# Patient Record
Sex: MALE | Race: WHITE
[De-identification: names, ages, dates, MRNs, and addresses within clinical notes are randomized per-mention and may not be internally consistent; named-entity substitution may affect disease eponyms.]

---

## 2019-08-26 ENCOUNTER — HOSPITAL ENCOUNTER (INPATIENT)
Dept: HOSPITAL 83 - ED | Age: 69
LOS: 5 days | Discharge: OTHER INSTITUTION | DRG: 280 | End: 2019-08-31
Attending: INTERNAL MEDICINE | Admitting: INTERNAL MEDICINE
Payer: MEDICARE

## 2019-08-26 VITALS — DIASTOLIC BLOOD PRESSURE: 42 MMHG

## 2019-08-26 VITALS — SYSTOLIC BLOOD PRESSURE: 114 MMHG | DIASTOLIC BLOOD PRESSURE: 46 MMHG

## 2019-08-26 VITALS — SYSTOLIC BLOOD PRESSURE: 115 MMHG | DIASTOLIC BLOOD PRESSURE: 35 MMHG

## 2019-08-26 VITALS — DIASTOLIC BLOOD PRESSURE: 64 MMHG | SYSTOLIC BLOOD PRESSURE: 145 MMHG

## 2019-08-26 VITALS — SYSTOLIC BLOOD PRESSURE: 120 MMHG | DIASTOLIC BLOOD PRESSURE: 56 MMHG

## 2019-08-26 VITALS — HEIGHT: 68 IN | WEIGHT: 244 LBS | BODY MASS INDEX: 36.98 KG/M2

## 2019-08-26 VITALS — DIASTOLIC BLOOD PRESSURE: 48 MMHG | SYSTOLIC BLOOD PRESSURE: 116 MMHG

## 2019-08-26 DIAGNOSIS — N18.3: ICD-10-CM

## 2019-08-26 DIAGNOSIS — D72.829: ICD-10-CM

## 2019-08-26 DIAGNOSIS — Z86.73: ICD-10-CM

## 2019-08-26 DIAGNOSIS — E83.41: ICD-10-CM

## 2019-08-26 DIAGNOSIS — F41.9: ICD-10-CM

## 2019-08-26 DIAGNOSIS — J96.11: ICD-10-CM

## 2019-08-26 DIAGNOSIS — Z82.49: ICD-10-CM

## 2019-08-26 DIAGNOSIS — Z79.84: ICD-10-CM

## 2019-08-26 DIAGNOSIS — E11.51: ICD-10-CM

## 2019-08-26 DIAGNOSIS — L97.519: ICD-10-CM

## 2019-08-26 DIAGNOSIS — I25.709: ICD-10-CM

## 2019-08-26 DIAGNOSIS — E53.8: ICD-10-CM

## 2019-08-26 DIAGNOSIS — Z71.6: ICD-10-CM

## 2019-08-26 DIAGNOSIS — E87.8: ICD-10-CM

## 2019-08-26 DIAGNOSIS — Z79.899: ICD-10-CM

## 2019-08-26 DIAGNOSIS — I13.0: ICD-10-CM

## 2019-08-26 DIAGNOSIS — I50.43: ICD-10-CM

## 2019-08-26 DIAGNOSIS — I25.5: ICD-10-CM

## 2019-08-26 DIAGNOSIS — E11.621: ICD-10-CM

## 2019-08-26 DIAGNOSIS — D53.9: ICD-10-CM

## 2019-08-26 DIAGNOSIS — F17.210: ICD-10-CM

## 2019-08-26 DIAGNOSIS — Z79.4: ICD-10-CM

## 2019-08-26 DIAGNOSIS — Z81.8: ICD-10-CM

## 2019-08-26 DIAGNOSIS — Z88.8: ICD-10-CM

## 2019-08-26 DIAGNOSIS — G47.30: ICD-10-CM

## 2019-08-26 DIAGNOSIS — D69.6: ICD-10-CM

## 2019-08-26 DIAGNOSIS — E44.0: ICD-10-CM

## 2019-08-26 DIAGNOSIS — Z79.82: ICD-10-CM

## 2019-08-26 DIAGNOSIS — K59.00: ICD-10-CM

## 2019-08-26 DIAGNOSIS — E11.42: ICD-10-CM

## 2019-08-26 DIAGNOSIS — E11.65: ICD-10-CM

## 2019-08-26 DIAGNOSIS — Z95.5: ICD-10-CM

## 2019-08-26 DIAGNOSIS — Z95.1: ICD-10-CM

## 2019-08-26 DIAGNOSIS — K21.9: ICD-10-CM

## 2019-08-26 DIAGNOSIS — E11.22: ICD-10-CM

## 2019-08-26 DIAGNOSIS — I21.4: Primary | ICD-10-CM

## 2019-08-26 DIAGNOSIS — E66.9: ICD-10-CM

## 2019-08-26 DIAGNOSIS — I25.2: ICD-10-CM

## 2019-08-26 DIAGNOSIS — R00.1: ICD-10-CM

## 2019-08-26 LAB
ALBUMIN SERPL-MCNC: 3.2 GM/DL (ref 3.1–4.5)
ALP SERPL-CCNC: 101 U/L (ref 45–117)
ALT SERPL W P-5'-P-CCNC: 15 U/L (ref 12–78)
APPEARANCE UR: CLEAR
APTT PPP: 36.6 SECONDS (ref 20–32.1)
AST SERPL-CCNC: 16 IU/L (ref 3–35)
BACTERIA #/AREA URNS HPF: (no result) /[HPF]
BASOPHILS # BLD AUTO: 0 10*3/UL (ref 0–0.1)
BASOPHILS NFR BLD AUTO: 0.2 % (ref 0–1)
BILIRUB UR QL STRIP: NEGATIVE
BUN SERPL-MCNC: 36 MG/DL (ref 7–24)
CHLORIDE SERPL-SCNC: 113 MMOL/L (ref 98–107)
COLOR UR: YELLOW
CREAT SERPL-MCNC: 1.63 MG/DL (ref 0.7–1.3)
EOSINOPHIL # BLD AUTO: 0.2 10*3/UL (ref 0–0.4)
EOSINOPHIL # BLD AUTO: 1.2 % (ref 1–4)
ERYTHROCYTE [DISTWIDTH] IN BLOOD BY AUTOMATED COUNT: 17.1 % (ref 0–14.5)
GLUCOSE UR QL: NEGATIVE
HCT VFR BLD AUTO: 28.2 % (ref 42–52)
HGB BLD-MCNC: 8.5 G/DL (ref 14–18)
HGB UR QL STRIP: NEGATIVE
INR BLD: 1 (ref 2–3.5)
KETONES UR QL STRIP: NEGATIVE
LEUKOCYTE ESTERASE UR QL STRIP: (no result)
LIPASE SERPL-CCNC: 72 U/L (ref 73–393)
LYMPHOCYTES # BLD AUTO: 1 10*3/UL (ref 1.3–4.4)
LYMPHOCYTES NFR BLD AUTO: 8.5 % (ref 27–41)
MCH RBC QN AUTO: 31.4 PG (ref 27–31)
MCHC RBC AUTO-ENTMCNC: 30.1 G/DL (ref 33–37)
MCV RBC AUTO: 104.1 FL (ref 80–94)
MONOCYTES # BLD AUTO: 0.9 10*3/UL (ref 0.1–1)
MONOCYTES NFR BLD MANUAL: 7 % (ref 3–9)
NEUT #: 10.1 10*3/UL (ref 2.3–7.9)
NEUT %: 82 % (ref 47–73)
NITRITE UR QL STRIP: NEGATIVE
NRBC BLD QL AUTO: 0 % (ref 0–0)
PH UR STRIP: 5.5 [PH] (ref 5–9)
PLATELET # BLD AUTO: 124 10*3/UL (ref 130–400)
PMV BLD AUTO: 11.7 FL (ref 9.6–12.3)
POTASSIUM SERPL-SCNC: 4.9 MMOL/L (ref 3.5–5.1)
PROT SERPL-MCNC: 6.4 GM/DL (ref 6.4–8.2)
RBC # BLD AUTO: 2.71 10*6/UL (ref 4.5–5.9)
SODIUM SERPL-SCNC: 141 MMOL/L (ref 136–145)
SP GR UR: 1.02 (ref 1–1.03)
TROPONIN I SERPL-MCNC: 1.11 NG/ML (ref ?–0.04)
UROBILINOGEN UR STRIP-MCNC: 0.2 E.U./DL (ref 0.2–1)
WBC #/AREA URNS HPF: (no result) WBC/HPF (ref 0–5)
WBC NRBC COR # BLD AUTO: 12.3 10*3/UL (ref 4.8–10.8)

## 2019-08-26 NOTE — EKG
[place], [place]
 
                               ELECTROCARDIOGRAM REPORT
 
        NAME: ELISABETH BENAVIDES SR                ACCT #: [id number]  
        UNIT #: [id number]                        ROOM: [facility]    
        DOCTOR: LI DRAFT REPORT          BIRTHDATE: [birth year]
 
 
 

 
 
                           [facility]
                                       
Test Date:    2019               Test Time:    17:50:33
Pat Name:     ELISABETH BENAVIDES           Department:   
Patient ID:   [id number]             Room:         [facility]
Gender:       M                        Technician:   Martine Scales
:          1950               Requested By: ELISABETH SOTO
Order Number: [id number]      Reading MD:   Juan Forman MD
                                 Measurements
Intervals                              Axis          
Rate:         61                       P:            
HI:                                    QRS:          120
QRSD:         199                      T:            44
QT:           533                                    
QTc:          537                                    
                           Interpretive Statements
Sinus rhythm
RBBB and LPFB
 
 
Electronically Signed On 2019 4:16:17 PDT by Juan Forman MD
 
CM:EKGRPT:ELECTROCARDIOGRAM REPORT
 
D: 19 1750
T: 19 0416
    
ELISABETH SÁNCHEZ DRAFT REPORT         
ELISABETH SOTO DO

## 2019-08-26 NOTE — EKG
[place], [place]
 
                               ELECTROCARDIOGRAM REPORT
 
        NAME: ELISABETH BENAVIDES SR                ACCT #: [id number]  
        UNIT #: [id number]                        ROOM: [facility]    
        DOCTOR: LI DRAFT REPORT          BIRTHDATE: [birth year]
 
 
 

 
 
                           [facility]
                                       
Test Date:    2019               Test Time:    19:31:51
Pat Name:     ELISABETH BENAVIDES           Department:   
Patient ID:   [id number]             Room:         [facility]
Gender:       M                        Technician:   Andrés Aponte
:          1950               Requested By: ELISABETH SOTO
Order Number: [id number]      Reading MD:   Juan Forman MD
                                 Measurements
Intervals                              Axis          
Rate:         61                       P:            60
WI:           162                      QRS:          116
QRSD:         184                      T:            64
QT:           506                                    
QTc:          510                                    
                           Interpretive Statements
Sinus rhythm
RBBB and LPFB
 
Electronically Signed On 2019 4:21:10 PDT by Juan Forman MD
 
CM:EKGRPT:ELECTROCARDIOGRAM REPORT
 
D: 19
T: 19 0421
    
ELISABETH SÁNCHEZ DRAFT REPORT         
ELISABETH SOTO DO

## 2019-08-26 NOTE — PR
[place], [place]
 
                                      PROGRESS NOTE
 
        NAME: ELISABETH BENAVIDES SR               [id number]  
        [facility] #: [id number]                       ROOM: [facility]    
        DOCTOR: REY RASMUSSEN                   BIRTHDATE: [birth year]
 
 
DOS: 08/29/2019
 
CARDIOLOGY PROGRESS NOTE
 
The patient is being seen in followup for non-ST elevation MI and acute on
chronic heart failure.
 
SUBJECTIVE:  The patient states the breathing is better.  Denies any chest pain.
 Continues to diurese well, negative another 2.5 liters overnight.  He remained
in sinus rhythm on telemetry.
 
PHYSICAL EXAMINATION:
VITAL SIGNS:  Afebrile, pulse 60, respirations 14, blood pressure 138/52,
saturating 98% on 2 liters nasal cannula.
GENERAL:  He is an obese male now sitting up in a chair, in no distress.
NECK:  Supple.  Jugular venous pressure difficult to assess given body habitus. 
No carotid bruits.
RESPIRATORY:  Still with some bibasilar rales.
CARDIOVASCULAR:  Regular rhythm with a normal rate.  No murmurs.
ABDOMEN:  Obese.  Positive bowel sounds.  Soft and nontender.
EXTREMITIES:  No significant edema.
SKIN:  Multiple skin abrasions are covered.  Feet were covered.
 
LABORATORY DATA:  Hemoglobin 9.6, platelets 125, potassium 3.5, bicarbonate 33,
BUN 30, creatinine 1.32.
 
Current cardiac medications include metoprolol succinate 25 mg p.o. daily,
lisinopril 5 mg daily, furosemide 40 mg IV b.i.d., clopidogrel 75 mg daily,
aspirin 81 mg daily, amiodarone 200 mg daily, atorvastatin 80 mg daily,
enoxaparin 100 mg subcutaneous b.i.d.
 
Echocardiogram, this admission revealed mildly reduced LV systolic function with
EF of 45-50%.
 
IMPRESSION:
1.  Non-ST elevation myocardial infarction, peak troponin 1.6.  Continues to be
stable and chest pain free.
2.  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
diuresing well and continues to improve.
3.  Mild ischemic cardiomyopathy, EF 45-50%.
4.  Anemia with recent gastrointestinal bleed due to gastritis.
5.  Known coronary artery disease, status post 4-vessel bypass.
6.  PCI in 03/2019 to the proximal vein graft to OM, residual disease in the
distal graft that was unable to be intervened upon.
7.  Question of paroxysmal atrial fibrillation, maintained on amiodarone, has
refused anticoagulation in the past.
8.  Chronic kidney disease, creatinine continues to improve with diuresis.
9.  Anemia and thrombocytopenia.
10.  Peripheral vascular disease with history of lower extremity interventions
as well as carotid endarterectomy.
 
                              [place], [place]
 
                                      PROGRESS NOTE
 
        NAME: ELISABETH BENAVIDES SR               ACCT #: [id number]  
        UNIT #: [id number]                       ROOM: [facility]    
        DOCTOR: REY RASMUSSEN                   BIRTHDATE: [birth year]
 
 
11.  Diabetes, active smoker, obesity, untreated sleep apnea,
12.  Diabetic foot ulcers.
 
RECOMMENDATIONS:
1.  Continue with IV diuretics and monitoring of I's and O's.
2.  Metoprolol tartrate was switched to succinate given his LV systolic
dysfunction.
3.  I have resumed his lisinopril and discontinue the isosorbide and
hydralazine.
4.  Risk factor modification with smoking cessation encouraged.
5.  Outpatient sleep study upon discharge.
6.  Continue to treat MI medically, with no immediate plans for cardiac
catheterization given the recent bleed.
 
 
 
_________________________________
Dr. REY RASMUSSEN MD
 
CM:PNTRANS
 
D: 08/29/19 1118                 
T: 08/29/19 1237
             
                                                            
REY RASMUSSEN                   
                                                            
08/29/19
1237
                              
interface

## 2019-08-26 NOTE — PR
[place], [place]
 
                                      PROGRESS NOTE
 
        NAME: ELISABETH BENAVIDES SR               [facility] #: [id number]  
        UNIT #: [id number]                       ROOM: 507       
        DOCTOR: REY RASMUSSEN                   BIRTHDATE: [birth year]
 
 
DOS: 08/31/2019
 
CARDIOLOGY PROGRESS NOTE
 
The patient is being seen in followup for non-ST elevation MI and acute on
chronic heart failure.
 
SUBJECTIVE:  The patient still has not moved his bowels.  He still has low
appetite, some abdominal discomfort with nausea.  He states, however, his
breathing is better.  Denies any chest pain.  Net negative another 1.7 liters in
the past 24 hours.  I had switched him to oral furosemide as of yesterday
evening.  He remained in normal sinus rhythm on the monitor with no events.
 
OBJECTIVE:
VITAL SIGNS:  Afebrile, pulse 68, respirations 20, blood pressure 118/60,
saturating 100% on 3 liters.
GENERAL APPEARANCE:  Overweight older gentleman sitting up in a chair, in no
distress.
NECK:  Supple.  JVP appears normal.  No carotid bruits.
RESPIRATORY:  Lungs are diminished.
CARDIOVASCULAR:  Regular rhythm, with normal rate.  No murmurs.
ABDOMEN:  Obese.  Positive bowel sounds.  Soft, nontender.
EXTREMITIES:  Multiple skin abrasions.  Right foot is wrapped and has a wound
VAC attached.  No significant peripheral edema.
 
LABORATORY DATA:  Hemoglobin 10, with platelets of 124, potassium 3.9,
bicarbonate 39, BUN 31, with creatinine up to 1.53 from 1.29 yesterday.
 
CURRENT CARDIAC MEDICATIONS:  Include furosemide 40 mg p.o. b.i.d., metoprolol
succinate 25 mg p.o. daily, lisinopril 5 mg p.o. daily, clopidogrel 75 mg p.o.
daily, aspirin 81 mg p.o. daily, amiodarone 200 mg p.o. daily, atorvastatin 80
mg p.o. daily.
 
IMPRESSION:
1.  Non-ST elevation myocardial infarction, troponin peaked at 1.6.  No further
chest pain.
2.  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
negative over 10 liters for the hospital stay, nearly euvolemic.
3.  Mild ischemic cardiomyopathy, ejection fraction 45-50%.
4.  Anemia with recent gastrointestinal bleed with gastritis.
5.  Known coronary artery disease, status post 4-vessel bypass back in 2011 and
2012.
6.  Recent PCI in 03/2019 to the proximal portion of vein graft to the OM. 
There is residual severe disease in the distal portion of the graft that was
unable to be intervened upon.
7.  Question of paroxysmal atrial fibrillation, maintained on amiodarone, has
declined anticoagulation in the past.
8.  Chronic kidney disease, creatinine bumped slightly.
9.  Anemia, thrombocytopenia, stable.
10.  Peripheral vascular disease, with lower extremity interventions in the past
 
                              [place], [place]
 
                                      PROGRESS NOTE
 
        NAME: ELISABETH BENAVIDES SR               ACCT #: [id number]  
        UNIT #: [id number]                       ROOM: 507       
        DOCTOR: REY RASMUSSEN                   BIRTHDATE: [birth year]
 
 
as well as carotid endarterectomy.
11.  Diabetic foot ulcers.
12.  Type 2 diabetes, active smoker, obesity, untreated sleep apnea.
 
RECOMMENDATIONS:
1.  We will change furosemide to 40 p.o. daily.
2.  Continue metoprolol succinate and his lisinopril for LV systolic
dysfunction.
3.  Again, counseled on the importance of smoking cessation and risk factor
modification.
4.  Recommend outpatient sleep study.
5.  Continue aspirin and clopidogrel for medical treatment of his recent MI. 
Cardiac catheterization would be elevated risk at this time given his recent GI
bleeding.
6.  Stable for discharge from Cardiology standpoint.  Recommend aggressive bowel
regimen so he can move his bowels as this is likely causing to feel unwell.  He
can follow up with his primary cardiologist at [place].  I will sign off at this
time.
 
 
 
_________________________________
Dr. REY RASMUSSEN MD
 
CM:PNTRANS
 
D: 08/31/19 1237                 
T: 09/01/19 0044
             
                                                            
REY RASMUSSEN                   
                                                            
09/01/19
0207
                              
interface

## 2019-08-26 NOTE — PR
[place], [place]
 
                                      PROGRESS NOTE
 
        NAME: ELISABETH BENAVIDES SR               Located within [facility] #: [id number]  
        UNIT #: [id number]                       ROOM: 507       
        DOCTOR: REY RASMUSSEN                   BIRTHDATE: [birth year]
 
 
DOS: 08/30/2019
 
The patient has been seen in followup for non-ST elevation MI and acute on
chronic heart failure.
 
SUBJECTIVE:  The patient states he does not feel well today, states he feels
nauseous, kind of sick to his stomach and low appetite, not eating much.  He has
not moved his bowels in 3-4 days.  Denies abdominal pain.  He states his
breathing is better, he is off oxygen.  Denies chest pain.  He had negative
another 1.8 liters in the past 24 hours for a total of about over 10 liters
negative for the hospital stay.  He remains in sinus rhythm on the monitor.
 
OBJECTIVE:
VITAL SIGNS:  He is afebrile, pulse 68, respirations 18, blood pressure 114/73,
saturating 99% on room air.
GENERAL:  Obese male, sitting in a chair, appears in no distress.
NECK:  Supple.  JVP difficult to assess given body habitus.  No carotid bruits.
RESPIRATORY:  Diminished with faint basilar rales.
CARDIOVASCULAR:  Regular rhythm, normal rate.  No murmurs.
ABDOMEN:  Obese.  Positive bowel sounds.  Soft, nontender.
EXTREMITIES:  No significant edema.
SKIN:  Multiple skin abrasions, has a wound VAC on the right foot.
 
LABORATORY DATA:  White blood cell count 11.7, hemoglobin 9.6, platelets 125. 
Serum bicarbonate upto 36, BUN 27 with creatinine 1.29.
 
Current cardiac medications include furosemide 40 mg IV b.i.d., metoprolol
succinate 25 mg p.o. daily, lisinopril 5 mg daily, clopidogrel 75 mg daily,
aspirin 81 mg daily, amiodarone 200 mg daily, atorvastatin 80 mg daily,
enoxaparin 100 mg subcutaneous q.12 hours.
 
Echocardiogram this admission showed mildly reduced LV systolic function with EF
of 45-50%.
 
ASSESSMENT:
1.  Non-ST elevation myocardial infarction, with peak troponin of 1.6.  Remains
hemodynamically stable and chest pain free.
2.  Acute on chronic mixed systolic and diastolic congestive heart failure.  He
has diuresed over 10 liters, is nearly euvolemic.  Bicarbonate is starting to
trend up.
3.  Mild ischemic cardiomyopathy, EF 45-50%.
4.  Anemia with recent gastrointestinal bleed due to gastritis.
5.  Known coronary artery disease, status post 4-vessel bypass in 2011 or 2012.
6.  Recent PCI in 03/2019, the proximal portion of the vein graft to the obtuse
marginal, residual severe disease in the distal portion of the graft.  It was
unable to be intervened upon.
7.  Question of paroxysmal atrial fibrillation, maintained on amiodarone, has
refused anticoagulation in the past.
8.  Chronic kidney disease, creatinine continues to improve.
9.  Anemia, thrombocytopenia, stable.
 
                              [place], [place]
 
                                      PROGRESS NOTE
 
        NAME: ELISABETH BENAVIDES SR               ACCT #: [id number]  
        UNIT #: [id number]                       ROOM: 507       
        DOCTOR: REY RASMUSSEN                   BIRTHDATE: [birth year]
 
 
10.  Peripheral vascular disease with lower extremity intervention as well as
carotid endarterectomy.
11.  Diabetic foot ulcers.
12.  Diabetes, active smoker, obesity, untreated sleep apnea.
 
RECOMMENDATIONS:
1.  After his IV dose of furosemide this morning, we will change to 40 p.o.
b.i.d. starting this evening with continued monitoring of I's and O's.  He is
nearly euvolemic.
2.  Continue metoprolol succinate and lisinopril for his LV systolic
dysfunction.
3.  Ongoing risk factor modification and encouragement for smoking cessation
provided.
4.  Recommend outpatient sleep study.
5.  Continue medical treatment for his myocardial infarction with no immediate
plans for cardiac catheterization given recent GI bleeding.  It would be
reasonable to put him back on prophylactic dose of enoxaparin.
 
 
 
_________________________________
Dr. REY RASMUSSEN MD
 
CM:PNTRANS
 
D: 08/30/19 1332                 
T: 08/30/19 2321
             
                                                            
REY RASMUSSEN                   
                                                            
08/30/19
2321
                              
interface

## 2019-08-26 NOTE — NUR
ASSUMED CARE FROM KAREN WEAVER. PATIENT RESTING IN BED WITH NO C/O PAIN,
DISCOMFORT OR SHORTNESS OF BREATHE. PATIENT STATES HE DOES USE OXYGEN AT NIGHT
AND 2L NC WAS APPLIED. PATIENT IS AFIB ON THE MONITOR WITH A RBBB. PATIENT IS
ASYMPTOMATIC OF ANY CHEST PAIN T/O ASSESSMENT. CALL LIGHT WITHIN REACH.

## 2019-08-26 NOTE — CON
[place], [place]
 
                                 REPORT OF CONSULTATION
 
        NAME: ELISABETH BENAVIDES SR                 [id number]  
        [facility] #: [id number]                         ROOM: [facility]3    
        DOCTOR: REY RASMUSSEN                     BIRTHDATE: [birth year]
 
 
DOS: 2019
 
CARDIOLOGY CONSULTATION
 
REASON FOR CONSULTATION:  CHF, elevated troponin.
 
REQUESTING PHYSICIAN:  Dr. Patrick Fernandes.
 
HISTORY OF PRESENT ILLNESS:  The patient is a 69-year-old gentleman with a known
history of coronary artery disease, status post 4-vessel CABG in either  or
.  He is known to have normal ejection fraction as well as diabetes, severe
peripheral vascular disease, and active tobacco abuse.  He was recently admitted
earlier this month with a GI bleed.  Upper endoscopy revealed gastritis, the
patient refused colonoscopy.  Hemoglobin was as low as 6.5 on that admission.
 
The patient now comes in with about 2 days history of fullness in his chest. 
The patient is a very vague historian and is not able to provide many details. 
He is not very active and this did occur at rest.  It was nonradiating.  Denies
associated nausea or diaphoresis.  He does get short of breath with exertion.
 
At this time, he states his breathing is a little bit better.  Denies active
chest pain.
 
DETAILS OF CARDIAC HISTORY:  CABG in  and  at [facility],
4-vessel, LIMA to LAD, vein to OM1, vein to OM2, vein to posterolateral branch.
 
In 2019, he had unstable angina and abnormal stress test, underwent right and
left heart catheterization at which time he received a drug-eluting stent to the
vein graft to one of the obtuse marginal branches.  That study showed no left
main disease, LAD with a proximal 60% stenosis and the mid 80% stenosis,
circumflex with a proximal 70% stenosis and occluded distally.  The RCA was 100%
occluded.  The LIMA to LAD was widely patent.  Vein to the right was patent with
some mild disease.  Vein to the OM1 was extremely small.  Vein to OM2 showed
severe proximal lesion of about 90% and a distal lesion of about 90%.  The
proximal lesion was stented.  It was then attempted to intervene upon the distal
lesion, but they were unable to get a Jerald pass the prior stent.  The patient
was brought back for repeat heart catheterization and a repeat attempt on the
vessel was unsuccessful.  He was thus transferred to [facility] where they again
attempted with multiple ties of equipment to pass the stent in the graft and
were unable to do so.  The patient has been treated medically.
 
REVIEW OF SYSTEMS:  A full review of systems negative except as described above.
 
PAST MEDICAL HISTORY:  Coronary artery disease as described, type 2 diabetes,
CKD, hypertension, gastritis with recent GI bleeding, peripheral arterial
disease.  Question of atrial fibrillation, he is on amiodarone, but has refused
anticoagulation per outpatient notes.
 
SURGICAL HISTORY:  CABG as described, also history of carotid endarterectomy and
multiple intervention and angioplasties in the legs, details unknown.
 
                              [place], [place]
 
                                 REPORT OF CONSULTATION
 
        NAME: ELISABETH BENAVIDES SR                 ACCT #: [id number]  
        UNIT #: [id number]                         ROOM: [facility]    
        DOCTOR: REY RASMUSSEN                     BIRTHDATE: [birth year]
 
 
 
SOCIAL HISTORY:  Smokes a pack and a half a day.  Denies alcohol or drug use.
 
FAMILY HISTORY:  Father did have coronary artery disease, unclear of age of
onset.  Both parents are .
 
ALLERGIES:  LISTED TO NAPROXEN.
 
HOME MEDICATIONS:  Include amiodarone 200 mg daily, aspirin 81 mg daily,
clopidogrel 75 mg daily, furosemide 40 mg taken as needed, lisinopril 5 mg
daily, metoprolol tartrate 25 mg p.o. b.i.d., ranolazine 1000 mg daily,
rosuvastatin 10 mg daily, he is also on pregabalin, potassium gluconate,
pantoprazole, metformin, insulin glargine, fish oil, ferrous gluconate, vitamin
D3, calcium carbonate, budesonide, formoterol inhaler.
 
PHYSICAL EXAMINATION:
VITAL SIGNS:  Afebrile, pulse 65, respirations 16, blood pressure 120/61,
saturating 98% on 2 liters nasal cannula.
GENERAL APPEARANCE:  He is morbidly obese older male, lying in bed, in no
distress.
NECK:  Supple.  JVP difficult to assess given body habitus.  No carotid bruits. 
ENT showed moist mucous membranes.
RESPIRATORY:  Diminished with few basilar rales.
CARDIOVASCULAR:  Regular rhythm, normal rate.  No murmurs.
ABDOMEN:  Obese.  Positive bowel sounds.  Soft, nontender.
EXTREMITIES:  Both feet are wrapped.  He has a wound VAC on the right foot and
multiple ulcerations of the toes.
 
LABORATORY DATA:  White blood cell count 13.1, hemoglobin 8.4, platelets 123,
potassium 4.7, BUN 39, creatinine 1.54.  Troponin initially was 1.1, peaked at
1.6 and is trending down.
 
EKG showed sinus rhythm with a rate in the 60s.  There is a right bundle branch
block and left posterior fascicular block.  No acute ST or T-wave abnormalities.
 On telemetry, he remains in sinus rhythm.  There has been no evidence of atrial
fibrillation.
 
IMPRESSION:
1.  Non-ST elevation myocardial infarction, peak troponin of 1.6.  The patient
is chest pain free.
2.  Acute on chronic diastolic congestive heart failure, diuresing well,
negative 2 liters yesterday and already negative 1.6 liters for today.
3.  Anemia with recent gastrointestinal bleed due to gastritis.
4.  Coronary artery disease, status post 4-vessel coronary artery bypass graft
as described above.
5.  Percutaneous coronary intervention to the vein graft to obtuse marginal
branch in 2019 and the proximal portion of the graft, unable to intervene
on the distal lesion.
6.  Question of paroxysmal atrial fibrillation, maintaining sinus rhythm on
amiodarone, has refused anticoagulation in the past.
 
                              [place], [place]
 
                                 REPORT OF CONSULTATION
 
        NAME: ELISABETH BENAVIDES SR                 ACCT #: [id number]  
        UNIT #: [id number]                         ROOM: [facility]    
        DOCTOR: REY RASMUSSEN                     BIRTHDATE: [birth year]
 
 
7.  Chronic kidney disease.
8.  Anemia and thrombocytopenia.
9.  Diabetes.
10.  Peripheral vascular disease, with history of lower extremity intervention
and carotid endarterectomy.
11.  Active smoker.
12.  Obesity.
 
RECOMMENDATIONS:
1.  Given his anemia and recent GI bleeding, it would be relatively high risk to
undergo heart catheterization at this point in time, because if he required
stent placement, interruption of dual antiplatelet therapy could be very
problematic.  As it is, he did have a PCI 5 months ago.  He is relatively stable
and chest pain free at this time and we will opt to treat him medically.
2.  Continue with IV diuresis, strict monitoring of I's and O's.
3.  Continue the aspirin and clopidogrel as he seems to be tolerating it, we
will closely watch his blood counts.
4.  Recommend resuming his beta blocker.
5.  We will follow up on echocardiogram, which has already been ordered.
6.  Counseled the patient on smoking cessation, which is of most importance.
7.  He is on therapeutic Lovenox for anticoagulation.
8.  He is on high dose statin.
 
Thank you for the consultation.
 
 
 
_________________________________
Dr. REY RASMUSSEN MD
 
CM:CONSTR:REPORT OF CONSULTATION
 
D: 19 1627   T: 19     0411                                          interface

## 2019-08-26 NOTE — PR
[place], [place]
 
                                      PROGRESS NOTE
 
        NAME: ELISABETH BENAVIDES SR               [id number]  
        UNIT #: [id number]                       ROOM: [facility]    
        DOCTOR: REY RASMUSSEN                   BIRTHDATE: [birth year]
 
 
DOS: 08/28/2019
 
CARDIOLOGY PROGRESS NOTE
 
The patient is being seen in followup for non-ST elevation MI and acute on
chronic heart failure.
 
SUBJECTIVE:  The patient denies any complaints.  Denies any chest pain.  States
his breathing is a little bit better.  He continued to diurese well and has had
negative almost 4 liters over the past 24 hours.  On telemetry, he remains in
sinus rhythm with no evidence of atrial fibrillation.
 
OBJECTIVE:
VITAL SIGNS:  He is afebrile, pulse in the 60s, respirations 14, blood pressure
119/52, saturating 98% on room air.
GENERAL APPEARANCE:  He is an obese older male sitting up in bed, in no
distress.
NECK:  Supple.  Jugular venous pressure is very difficult to assess given body
habitus.  No carotid bruits.
RESPIRATORY:  Fairly clear, little bit diminished at the bases.  No laron rales.
CARDIOVASCULAR:  Regular rhythm with normal rate.  No murmurs.
ABDOMEN:  Obese.  Positive bowel sounds.  Soft, nontender.
EXTREMITIES:  His feet are wrapped.  No significant edema.  He has multiple skin
tears in the lower extremities along with skin thickening and flaking skin, with
some malodorous wounds coming from the foot.
 
LABORATORY DATA:  Hemoglobin 8.9, platelets 124.  Potassium 4.0, BUN 34,
creatinine 1.41.  Troponin peaked at 1.64.
 
CURRENT CARDIAC MEDICATIONS:  Include furosemide 40 IV b.i.d., metoprolol
tartrate 12.5 mg p.o. b.i.d., isosorbide mononitrate 30 mg daily, clopidogrel 75
mg daily,  aspirin 81 mg daily,  amiodarone 200 mg daily,  hydralazine 10 mg
p.o. q.  8 hours, atorvastatin 80 mg daily, enoxaparin 100 mg subcutaneous
b.i.d.
 
Echocardiogram from yesterday showed mildly reduced LV systolic function with an
EF of 45-50%.  Study was technically limited due to very difficult acoustic
windows despite the use of LV contrast agent.  There appeared to be hypokinesis
of the anterolateral wall, possibly hypokinesis of the anterior wall and of the
basal to mid inferolateral wall.  There is mild biatrial dilation, mild to
moderate MR.  RV systolic pressure was 34.  IVC was dilated, but collapsible
suggesting mildly elevated right atrial pressures.
 
ASSESSMENT:
1.  Non-ST elevation myocardial infarction with a peak troponin of 1.6. 
Remained chest pain free.
2.  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
continues to diurese well and is improving. 
3.  Mild ischemic cardiomyopathy with an EF of 45-50%.
4.  Anemia with recent gastrointestinal bleed due to gastritis.
 
                              [place], [place]
 
                                      PROGRESS NOTE
 
        NAME: ELISABETH BENAVIDES SR               ACCT #: [id number]  
        UNIT #: [id number]                       ROOM: [facility]    
        DOCTOR: REY RASMUSSEN                   BIRTHDATE: [birth year]
 
 
5.  Coronary artery disease, status post 4-vessel bypass.
6.  Percutaneous coronary intervention in March 2019 to the proximal portion of
the vein graft to the OM, with a residual distal graft lesion that was unable to
be intervened upon.
7.  Question of paroxysmal atrial fibrillation, the patient is on amiodarone,
but has refused anticoagulation in the past per office notes.
8.  Chronic kidney disease, creatinine improving with diuresis.
9.  Anemia and thrombocytopenia.
10.  Peripheral vascular disease with history of lower extremity intervention as
well as carotid endarterectomy.
11.  Diabetes, active smoker, obesity, likely untreated sleep apnea.
 
RECOMMENDATIONS:
1.   Agree with continued IV diuretics for today.
2.  Continuing to monitor I's and O's.
3.  Continue current medications including aspirin, clopidogrel, and the
enoxaparin as we will be treating his MI medically.
4.  We will change his metoprolol tartrate to succinate given the mild LV
systolic dysfunction.
5.  Reiterated smoking cessation with the patient.
6.  Recommended outpatient sleep study and treatment of obstructive sleep apnea.
7.  No immediate plans for cardiac catheterization given recent gastrointestinal
bleed.
 
 
 
_________________________________
Dr. REY RASMUSSEN MD
 
CM:PNTRANS
 
D: 08/28/19 1141                 
T: 08/28/19 2152
             
                                                            
REY RASMUSSEN                   
                                                            
08/29/19
0035
                              
interface

## 2019-08-26 NOTE — EKG
[place], [place]
 
                               ELECTROCARDIOGRAM REPORT
 
        NAME: ELISABETH BENAVIDES SR                ACCT #: [id number]  
        UNIT #: [id number]                        ROOM: [facility]    
        DOCTOR: LI DRAFT REPORT          BIRTHDATE: [birth year]
 
 
 

 
 
                           [facility]
                                       
Test Date:    2019               Test Time:    13:27:45
Pat Name:     ELISABETH BENAVIDES           Department:   
Patient ID:   [id number]             Room:         [facility]
Gender:       M                        Technician:   
:          1950               Requested By: ELISABETH SOTO
Order Number: [id number]      Reading MD:   Juan Forman MD
                                 Measurements
Intervals                              Axis          
Rate:         56                       P:            103
MN:           166                      QRS:          106
QRSD:         189                      T:            96
QT:           530                                    
QTc:          512                                    
                           Interpretive Statements
Sinus bradycardia
RBBB and LPFB
Compared to ECG 2019 22:01:00
No significant changes
 
Electronically Signed On 2019 4:45:58 PDT by Juan Forman MD
 
CM:EKGRPT:ELECTROCARDIOGRAM REPORT
 
D: 19 1327
T: 19 0445
    
ELISABETH SÁNCHEZ DRAFT REPORT         
ELISABETH SOTO DO

## 2019-08-26 NOTE — NUR
A 69, admitted to ICCU, under the
services of MUSTAPHA Schwartz DO with a diagnosis of CHF, NSTEMI.
Chief complaint is SHORTNESS OF BREATH.
Patient arrived via stretcher from ER.
Monitor applied. Initial assessment completed.
Vital signs taken and recorded.
MUSTAPHA SCHWARTZ DO notified of admission to the unit.
Orders received.
See assessment for past medical history, medications
and allergies.
Patient and/or family oriented to unit. [facility]
visitation policy reviewed.
Clothing/patient valuable form completed.
 
FATMATA BRANTLEY

## 2019-08-26 NOTE — NUR
WOUND VAC/DRESSING NOTED TO RIGHT LOWER LEG. DRESSING NOTED TO LEFT LOWER LEG.
PT STATES DRESSING TO LEFT LOWER LEG APPLIED VIA FOOT DOCTOR AT [facility]AB [facility].
NO WOUND PICTURES TAKEN OF RIGHT/LEFT LOWER LEG.

## 2019-08-27 VITALS — DIASTOLIC BLOOD PRESSURE: 50 MMHG

## 2019-08-27 VITALS — DIASTOLIC BLOOD PRESSURE: 51 MMHG

## 2019-08-27 VITALS — DIASTOLIC BLOOD PRESSURE: 52 MMHG | SYSTOLIC BLOOD PRESSURE: 135 MMHG

## 2019-08-27 VITALS — SYSTOLIC BLOOD PRESSURE: 111 MMHG | DIASTOLIC BLOOD PRESSURE: 58 MMHG

## 2019-08-27 VITALS — DIASTOLIC BLOOD PRESSURE: 61 MMHG

## 2019-08-27 LAB
BASOPHILS # BLD AUTO: 0 10*3/UL (ref 0–0.1)
BASOPHILS NFR BLD AUTO: 0.2 % (ref 0–1)
BUN SERPL-MCNC: 39 MG/DL (ref 7–24)
CHLORIDE SERPL-SCNC: 110 MMOL/L (ref 98–107)
CREAT SERPL-MCNC: 1.54 MG/DL (ref 0.7–1.3)
EOSINOPHIL # BLD AUTO: 0.2 10*3/UL (ref 0–0.4)
EOSINOPHIL # BLD AUTO: 1.1 % (ref 1–4)
ERYTHROCYTE [DISTWIDTH] IN BLOOD BY AUTOMATED COUNT: 17 % (ref 0–14.5)
HCT VFR BLD AUTO: 27.8 % (ref 42–52)
HGB BLD-MCNC: 8.4 G/DL (ref 14–18)
LYMPHOCYTES # BLD AUTO: 1.2 10*3/UL (ref 1.3–4.4)
LYMPHOCYTES NFR BLD AUTO: 8.9 % (ref 27–41)
MCH RBC QN AUTO: 30.8 PG (ref 27–31)
MCHC RBC AUTO-ENTMCNC: 30.2 G/DL (ref 33–37)
MCV RBC AUTO: 101.8 FL (ref 80–94)
MONOCYTES # BLD AUTO: 0.9 10*3/UL (ref 0.1–1)
MONOCYTES NFR BLD MANUAL: 6.5 % (ref 3–9)
NEUT #: 10.8 10*3/UL (ref 2.3–7.9)
NEUT %: 82.2 % (ref 47–73)
NRBC BLD QL AUTO: 0 10*3/UL (ref 0–0)
PHOSPHATE SERPL-MCNC: 4.1 MG/DL (ref 2.5–4.9)
PLATELET # BLD AUTO: 123 10*3/UL (ref 130–400)
PMV BLD AUTO: 12 FL (ref 9.6–12.3)
POTASSIUM SERPL-SCNC: 4.7 MMOL/L (ref 3.5–5.1)
RBC # BLD AUTO: 2.73 10*6/UL (ref 4.5–5.9)
SODIUM SERPL-SCNC: 140 MMOL/L (ref 136–145)
WBC NRBC COR # BLD AUTO: 13.1 10*3/UL (ref 4.8–10.8)

## 2019-08-27 NOTE — NUR
ELISABETH BENAVIDES SR [id number]
 
Please refer to the physician's history and physical for past medical history,
comorbid conditions, and allergies.
   Diagnosis: CONGESTIVE HEART FAILURE, UNSPECIFIED NSTEMI
 
   Jakub Score: 14,MODERATE RISK
 
WOUND DESCRIPTIONS:
Wound Number: 1
Location of the wound: plantar right great toe
Type of wound:
Thickness: Partial
Size: 1.9cm X 1.9cm X 0.1cm
Tunneling: none
Undermining: none
Sinus Tract: none
Presence of Exudate: Serous
Amount: Light
Color: Red
Odor: None
Periwound Skin Appearance: Edema
Wound edges: approximated. Callus
Pain (associated with wound): denied at time of assessment
How does patient state this happened? patient states this area has been going
on for approximately 1 month.
If wound is on legs/feet or hands, capillary refill time, pulses, color temp,
sensation: cap refill > 3 seconds.
 
Wound Number: 2
Location of the wound: left 5th toe
Thickness: Partial
Size: 0.6cm X 0.8cm X< 0.1cm
Tunneling: none
Undermining: none
Sinus Tract: none
Presence of Exudate: none
Amount: None
Color: Tan
Odor: None
Periwound Skin Appearance: Edema
Wound edges: approximated. Callus
Pain (associated with wound): denied at time of assessment
How does patient state this happened? patient states this area has been going
on for approximately 1 month.
If wound is on legs/feet or hands, capillary refill time, pulses, color temp,
sensation: cap refill > 3 seconds.
 
Wound #3 left knee, wound #4 right knee, and wound #5 left elbow intact scab.
No drainage noted at time of assessment.
Wound #6 left buttock and sacrum intact scar noted.
 
Wound Number: 7
Location of the wound: left 4th toe
Thickness: full
Size: 0.9cm X 1.0cm X< 0.1cm
Tunneling: none
Undermining: none
Sinus Tract: none
Presence of Exudate: none
Amount: None
Color: Tan
Odor: None
Periwound Skin Appearance: Edema
Wound edges: approximated. Callus
Pain (associated with wound): denied at time of assessment
How does patient state this happened? patient states this area has been going
on for approximately 1 month.
If wound is on legs/feet or hands, capillary refill time, pulses, color temp,
sensation: cap refill > 3 seconds.
 
Wound Number: 8
Location of the wound: left 3rd toe
Thickness: full
Size: 1.1cm X 1.1cm X< 0.1cm
Tunneling: none
Undermining: none
Sinus Tract: none
Presence of Exudate: none
Amount: None
Color: Dark Red
Odor: None
Periwound Skin Appearance: Edema
Wound edges: approximated. Callus
Pain (associated with wound): denied at time of assessment
How does patient state this happened? patient states this area has been going
on for approximately 1 month.
If wound is on legs/feet or hands, capillary refill time, pulses, color temp,
sensation: cap refill > 3 seconds.
 
Wound Number: 9
Location of the wound: left 2nd toe
Thickness: full
Size: 0.4cm X 1.0cm X< 0.1cm
Tunneling: none
Undermining: none
Sinus Tract: none
Presence of Exudate: none
Amount: None
Color: Dark Red
Odor: None
Periwound Skin Appearance: Edema
Wound edges: approximated. Callus
Pain (associated with wound): denied at time of assessment
How does patient state this happened? patient states this area has been going
on for approximately 1 month.
If wound is on legs/feet or hands, capillary refill time, pulses, color temp,
sensation: cap refill > 3 seconds.
 
Wound Number: 10
Location of the wound: left great toe
Thickness: full
Size: 1.3cm X 1.0cm X< 0.1cm
Tunneling: none
Undermining: none
Sinus Tract: none
Presence of Exudate: none
Amount: None
Color: Dark red
Odor: None
Periwound Skin Appearance: Edema
Wound edges: approximated. Callus
Pain (associated with wound): denied at time of assessment
How does patient state this happened? patient states this area has been going
on for approximately 1 month.
If wound is on legs/feet or hands, capillary refill time, pulses, color temp,
sensation: cap refill > 3 seconds.
 
Wound Number: 11
Location of the wound: left inner upper calf
Thickness: Partial
Size: 3.2cm X 1.9cm X 0.1cm
Tunneling: none
Undermining: none
Sinus Tract: none
Presence of Exudate: Serous
Amount: Light
Color: Pink
Odor: None
Periwound Skin Appearance: Edema
Wound edges: approximated.
Pain (associated with wound): denied at time of assessment
How does patient state this happened? patient states this area has been going
on for approximately 1 month.
If wound is on legs/feet or hands, capillary refill time, pulses, color temp,
sensation: cap refill > 3 seconds.
 
Wound Number: 12
Location of the wound: left upper mid shin
Thickness: Partial
Size: 0.9cm X 0.9cm X0.1cm
Tunneling: none
Undermining: none
Sinus Tract: none
Presence of Exudate: none
Amount: None
Color: Red
Odor: None
Periwound Skin Appearance: Edema
Wound edges: approximated.
Pain (associated with wound): denied at time of assessment
How does patient state this happened? patient states this area has been going
on for approximately 1 month.
If wound is on legs/feet or hands, capillary refill time, pulses, color temp,
sensation: cap refill > 3 seconds.
 
Wound Number: 13
Location of the wound: left outer upper shin
Thickness: Partial
Size: 1.5cm X 1.5cm X 0.1cm
Tunneling: none
Undermining: none
Sinus Tract: none
Presence of Exudate: none
Amount: None
Color: Red, Yellow
Odor: None
Periwound Skin Appearance: Edema
Wound edges: approximated.
Pain (associated with wound): denied at time of assessment
How does patient state this happened? patient states this area has been going
on for approximately 1 month.
If wound is on legs/feet or hands, capillary refill time, pulses, color temp,
sensation: cap refill > 3 seconds.
 
Wound Number: 14
Location of the wound: left outer calf
Thickness: Partial
Size: 1.0cm X 1.4cm X 0.1cm
Tunneling: none
Undermining: none
Sinus Tract: none
Presence of Exudate: none
Amount: None
Color: Red
Odor: None
Periwound Skin Appearance: Edema
Wound edges: approximated.
Pain (associated with wound): denied at time of assessment
How does patient state this happened? patient states this area has been going
on for approximately 1 month.
If wound is on legs/feet or hands, capillary refill time, pulses, color temp,
sensation: cap refill > 3 seconds.
 Spoke with Dr. Miramontes regarding wound Vac to right lower extremity. He
stated that the wound vac would be changed tomorrow. This nurse intructed Dr. Miramontes that a vac from our facility would need to be placed today due to our
policy is to not a vac on and running on a patient that did not come from our
facility. Dr. Miramontes stated he would be up to change vac today. Shift
supervisor advised of wound vac needed for patient. Pennie RN nurse caring for
patient was made aware of pictures and documentation needed of wound when
dressing is removed.
   Surface the patient is resting on: Isoflex
 
SKIN PREVENTION RECOMMENDATION:
   1.  Pressure redistribution support surface as appropriate
   2.  Elevate heels
   3.  Remove boots/TEDS every shift and reapply
   4.  Head of bed 30 degrees as tolerated
   5.  Assess nutrition and hydration
   6.  Manage moisture
   7.  Avoid the use of containment devices while in bed
   8.  Use absorptive products on surfaces limit layers of linens on bed
   9.  Turn and reposition every 1-2 hours in bed and every 1 hour in chair as
       tolerated
   10. Weight shifts every 15 minutes while up in chair
   11. Offloading with pillows or device to keep heels elevated off bed
   12. Monitor skin at least every shift
   13. Inspect under medical devices twice a day
 
 
WOUND TREATMENT RECOMMENDATIONS:
Continue orders put in by podiatry resident Dr. Miramontes 8/27/19.
Continue partial thickness guidelines for upper left shin and left outer upper
shin and left calf.
D/C unstageable guidelines for 4th and 5th toe 08/26/19.
Unstageable guidelines for left great toe, 2nd, 3rd, 4th and 5th toes: Cleanse
with normal saline apply betadine and leave open to air.
Venous studies to BLE due to edema. Arterials completed 07/18/19 at [facility].

## 2019-08-27 NOTE — NUR
PT AAOX3 .  RESP EASY.  VSS.  PT DENIES COMPLAINTS.  NO ACUTE DISTRESS NOTED.
WILL CONTINUE TO MONITOR PT.

## 2019-08-27 NOTE — NUR
DR OROZCO HERE AND D/C'D WOUND VAC TO RIGHT FOOT.  WOUND PICTURES TAKEN OF
WOUND.  ALSO DR OROZCO DEBRIDED LEFT INNER THIGH BLISTERS EARLIER WITHOUT MY
KNOWLEDGE.  POST DEBRIDEMENT PICTURE TAKEN.  DR OROZCO STATED THAT I COULD
REMOVE LEFT LEG DRESSING.  WOUND FOUND UNDER DRESSING.  PICTURE TAKEN.

## 2019-08-27 NOTE — NUR
in to see patient. He is very [place]. He is short term at [facility] and plans to return there upon discharge. He states he gets
around in a wheelchair. He has O2 @ 2L nc at  and O2 supplier is [place]
he thinks at home. When medically stable he will be discharged to [facility]. Discharge planner/ following. Spoke to Merari in
wound care regarding wound vac change. She will be up shortly.

## 2019-08-28 VITALS — DIASTOLIC BLOOD PRESSURE: 48 MMHG | SYSTOLIC BLOOD PRESSURE: 122 MMHG

## 2019-08-28 VITALS — DIASTOLIC BLOOD PRESSURE: 52 MMHG

## 2019-08-28 VITALS — DIASTOLIC BLOOD PRESSURE: 50 MMHG | SYSTOLIC BLOOD PRESSURE: 104 MMHG

## 2019-08-28 VITALS — DIASTOLIC BLOOD PRESSURE: 50 MMHG | SYSTOLIC BLOOD PRESSURE: 103 MMHG

## 2019-08-28 VITALS — SYSTOLIC BLOOD PRESSURE: 108 MMHG | DIASTOLIC BLOOD PRESSURE: 54 MMHG

## 2019-08-28 VITALS — SYSTOLIC BLOOD PRESSURE: 121 MMHG | DIASTOLIC BLOOD PRESSURE: 50 MMHG

## 2019-08-28 VITALS — DIASTOLIC BLOOD PRESSURE: 49 MMHG

## 2019-08-28 LAB
ALBUMIN SERPL-MCNC: 3.1 GM/DL (ref 3.1–4.5)
ALP SERPL-CCNC: 108 U/L (ref 45–117)
ALT SERPL W P-5'-P-CCNC: 13 U/L (ref 12–78)
AST SERPL-CCNC: 18 IU/L (ref 3–35)
BASOPHILS # BLD AUTO: 0 10*3/UL (ref 0–0.1)
BASOPHILS NFR BLD AUTO: 0.3 % (ref 0–1)
BUN SERPL-MCNC: 34 MG/DL (ref 7–24)
CHLORIDE SERPL-SCNC: 106 MMOL/L (ref 98–107)
CREAT SERPL-MCNC: 1.41 MG/DL (ref 0.7–1.3)
EOSINOPHIL # BLD AUTO: 0.2 10*3/UL (ref 0–0.4)
EOSINOPHIL # BLD AUTO: 1.4 % (ref 1–4)
ERYTHROCYTE [DISTWIDTH] IN BLOOD BY AUTOMATED COUNT: 16.7 % (ref 0–14.5)
HCT VFR BLD AUTO: 28 % (ref 42–52)
HGB BLD-MCNC: 8.9 G/DL (ref 14–18)
LYMPHOCYTES # BLD AUTO: 1.1 10*3/UL (ref 1.3–4.4)
LYMPHOCYTES NFR BLD AUTO: 9.5 % (ref 27–41)
MCH RBC QN AUTO: 31.7 PG (ref 27–31)
MCHC RBC AUTO-ENTMCNC: 31.8 G/DL (ref 33–37)
MCV RBC AUTO: 99.6 FL (ref 80–94)
MONOCYTES # BLD AUTO: 1 10*3/UL (ref 0.1–1)
MONOCYTES NFR BLD MANUAL: 8.7 % (ref 3–9)
NEUT #: 9.2 10*3/UL (ref 2.3–7.9)
NEUT %: 79 % (ref 47–73)
NRBC BLD QL AUTO: 0 % (ref 0–0)
PLATELET # BLD AUTO: 124 10*3/UL (ref 130–400)
PMV BLD AUTO: 11 FL (ref 9.6–12.3)
POTASSIUM SERPL-SCNC: 4 MMOL/L (ref 3.5–5.1)
PROT SERPL-MCNC: 6.3 GM/DL (ref 6.4–8.2)
RBC # BLD AUTO: 2.81 10*6/UL (ref 4.5–5.9)
SODIUM SERPL-SCNC: 141 MMOL/L (ref 136–145)
WBC NRBC COR # BLD AUTO: 11.7 10*3/UL (ref 4.8–10.8)

## 2019-08-28 NOTE — NUR
PHYSICAL THERAPY
 
Nursing screen received and chart reviewed. Patient short term resident at
[address] prior to admission and plans to return at discharge.  Recommend PT
evaluation when medically appropriate. Thank you. Kaleigh Beltre,PT,DPT.

## 2019-08-28 NOTE — NUR
OT NOTE
Pt was seen this P.M. 1:1 for 12 minute OT session. Upon arrival pt was
sitting upright in the recliner. Pt identified by name and  and had no
complaints at this time. Pt presented to therapy with continuous 2L-O2 via NC
which he remained on throughout entire session. At rest pt's heart rate was 71
bpm and SpO2 100%. Pt completed multiple sit to stand transfers from chair
level with modA X 2 and use of w/w for UE support. Challenged pt's static
standing tolerance needed for increased I in self care tasks and functional
transfers. Pt was able to tolerate aprox 30-60 seconds at a time before
sitting due to quick onset of fatigue. Throughout pt's heart rate remained
within 71-77 bpm and SpO2 100%. Throughout standing pt was 50% complaint with
weight bearing status requiring constant verbal prompts to correct. Pt was
left sitting reclined in the recliner with call light in hand, tray table in
place, and ICCU nurse notified. Continue with rec D/C plan to SNF.
 
ALYSON Anderson

## 2019-08-28 NOTE — NUR
in to see patient. No new needs or request at this time. When
medically stable he will be discharged to [facility].
Discharge planner/ following.

## 2019-08-28 NOTE — NUR
While I was in an isolation, helping another nurse, a wound vac was applied to
patient's rt foot. I did not see the rt foot wound. TAWANA Gage notified.

## 2019-08-28 NOTE — NUR
Updated clinicals faxed to [facility] for review. patient is short term care but can
return when medically stable for discharge.

## 2019-08-28 NOTE — NUR
PHYSICAL THERAPY
 
Physical therapy evaluation complete, [facility]. Full evaluation/details to follow.
Moderate complexity evaluation (40128) per chart review and evaluation. PT to
progress with transfers, gait, safety, and LE strength per POC. Recommend
return to SNF at discharge. Thank you. Kaleigh Beltre,PT,DPT.

## 2019-08-28 NOTE — NUR
MEDICATED WITH TUMS FOR COMPLAINTS OF HEARTBURN, PATIENT RELATES TO NOT EATING
MUCH LAST COUPLE OF DAYS.
RN WILL MONITOR FOR RELIEF OF SYMPTOMS

## 2019-08-28 NOTE — NUR
ON ASSESSMENT PT IS ALERT AND ORIENTED. HE'S HARD OF HEARING. HE OFFERS NO
VOICED COMPLAINTS OF PAIN. CLAIMS HIS BREATHING IS "OK". DRESSING INTACT TO RT
FOOT. BED IN LOW POSITION WITH WHEELS LOCKED, CALL LIGHT IN REACH. SEE ALL
APPROPRIATE INTERVENTIONS.

## 2019-08-29 VITALS — DIASTOLIC BLOOD PRESSURE: 72 MMHG

## 2019-08-29 VITALS — DIASTOLIC BLOOD PRESSURE: 49 MMHG

## 2019-08-29 VITALS — DIASTOLIC BLOOD PRESSURE: 50 MMHG | SYSTOLIC BLOOD PRESSURE: 140 MMHG

## 2019-08-29 VITALS — DIASTOLIC BLOOD PRESSURE: 50 MMHG

## 2019-08-29 VITALS — DIASTOLIC BLOOD PRESSURE: 58 MMHG | SYSTOLIC BLOOD PRESSURE: 132 MMHG

## 2019-08-29 VITALS — DIASTOLIC BLOOD PRESSURE: 52 MMHG

## 2019-08-29 LAB
BASOPHILS # BLD AUTO: 0 10*3/UL (ref 0–0.1)
BASOPHILS NFR BLD AUTO: 0.3 % (ref 0–1)
BUN SERPL-MCNC: 30 MG/DL (ref 7–24)
CHLORIDE SERPL-SCNC: 101 MMOL/L (ref 98–107)
CREAT SERPL-MCNC: 1.32 MG/DL (ref 0.7–1.3)
EOSINOPHIL # BLD AUTO: 0.1 10*3/UL (ref 0–0.4)
EOSINOPHIL # BLD AUTO: 1 % (ref 1–4)
ERYTHROCYTE [DISTWIDTH] IN BLOOD BY AUTOMATED COUNT: 16.4 % (ref 0–14.5)
HCT VFR BLD AUTO: 30 % (ref 42–52)
HGB BLD-MCNC: 9.6 G/DL (ref 14–18)
LYMPHOCYTES # BLD AUTO: 1 10*3/UL (ref 1.3–4.4)
LYMPHOCYTES NFR BLD AUTO: 9.9 % (ref 27–41)
MCH RBC QN AUTO: 31.1 PG (ref 27–31)
MCHC RBC AUTO-ENTMCNC: 32 G/DL (ref 33–37)
MCV RBC AUTO: 97.1 FL (ref 80–94)
MONOCYTES # BLD AUTO: 0.9 10*3/UL (ref 0.1–1)
MONOCYTES NFR BLD MANUAL: 8.8 % (ref 3–9)
NEUT #: 8.3 10*3/UL (ref 2.3–7.9)
NEUT %: 78.9 % (ref 47–73)
NRBC BLD QL AUTO: 0 10*3/UL (ref 0–0)
PLATELET # BLD AUTO: 125 10*3/UL (ref 130–400)
PMV BLD AUTO: 11.1 FL (ref 9.6–12.3)
POTASSIUM SERPL-SCNC: 3.5 MMOL/L (ref 3.5–5.1)
RBC # BLD AUTO: 3.09 10*6/UL (ref 4.5–5.9)
SODIUM SERPL-SCNC: 140 MMOL/L (ref 136–145)
WBC NRBC COR # BLD AUTO: 10.5 10*3/UL (ref 4.8–10.8)

## 2019-08-29 NOTE — NUR
CARDIOLOGIST IN TO SEE PT.  NO NEW ORDERS RECEIVED.  DOCTOR STATED PT COULD BE
MOVED TO TELEMETRY FROM HIS VIEWPOINT.

## 2019-08-29 NOTE — NUR
OT NOTE
*Spoke with pt's nurse Pennie in ICCU who reported that pt is NWB to his RLE,
will report information to OTR.
 
Pt was seen this A.M. 1:1 for 17 minute OT session. Upon arrival pt was
sitting upright in the recliner. Pt identified by name and  and had no
complaints at this time. Pt presented to therapy with wound vac on RLE,
resting heart rate of 76 bpm, and continuous 2L-O2 via NC which he remained on
throughout entire session. Pt was educated on weight bearing status and
verbalized understanding. Pt completed sit to stand transfer from chair level
with modA and verbal prompts for proper hand placement for improved technique
and increased I. Challenged pt's static standing tolerance needed for
increased I in self care tasks and functional transfers and for improved
endurance, pt was able to tolerate aprox 60 seconds before sitting due to
fatigue. Throughout pt was non compliant with NWB status even when provided
with verbal and visual instructions. Stand pivot completed from recliner to
and from bedside commode with Saroj and use of w/w for UE support. Throughout
pt continued to be non complaint with NWB status. Pt's heart rate elevated to
102 bpm throughout transfer. Pt was left sitting reclined in the recliner with
call light in hand, tray table in place, and ICCU nurse notified. Continue
with rec D/C plan to SNF.
 
ALYSON Anderson

## 2019-08-29 NOTE — NUR
PHYSICAL THERAPY
 
Patient was seen this am 1:1 for therapy visit and siting up in bedside
recliner chair upon therapist arrival. Patient presented with R LE wound vac
and continuous O2-2L via NC.  Nurse confirmed patient is NWB on R LE and
Supervising Therapist notified via personal discussion with this therapist.
Patient performed seated B LE therex, all planes, 2 x 10 to increase LE
strength, followed by several sit to stand transfers MOD A. Patient
demonstrated increased difficulty maintaining NWB status on R LE during
transfer and needed repeated v/c to improve awareness / performance. Patient
remained in bedside recliner with call light, tray table and telephone. Will
continue per POC as tolerated, total treatment time 14 minutes. Mohan Shaikh,
PTA

## 2019-08-29 NOTE — NUR
OCCUPATIONAL THERAPY CO-SIGN
 
I approve of the Occupational Therapy notes written above.
RENE DANIEL OTR/LIZZ

## 2019-08-29 NOTE — NUR
in to see patient. No new needs or request at this time. When
medically stable he will be discharged to [facility].
Discharge planner/ following.

## 2019-08-29 NOTE — NUR
PT REMAINS SITTING IN THE CHAIR.  VSS.  FINE PB RALES NOTED IN BILATERAL LOWER
LOBES.  LOWER LEGE EDEMA NOTED TO LEFT LEG.  RIGHT LEG WRAPPED.  WOUND VAC
REMAINS TO RIGHT FOOT.  PT DENIES C/O AT THIS TIME.  WILL CONTINUE TO MONITOR
PT.

## 2019-08-29 NOTE — NUR
[facility] Discussed with patient regarding transfer to the
[facility]. He states he doesn't think he can make the 2 1/2 hour drive and
no one up there to see him and get him back home. [facility] form signed by patient
and witnessed by CM. [facility]axed form to Le at the [facility] and Merari in registration.
Patient is agreeable to stay here at [facility] under his Medicare.

## 2019-08-30 VITALS — DIASTOLIC BLOOD PRESSURE: 62 MMHG | SYSTOLIC BLOOD PRESSURE: 116 MMHG

## 2019-08-30 VITALS — DIASTOLIC BLOOD PRESSURE: 46 MMHG

## 2019-08-30 VITALS — DIASTOLIC BLOOD PRESSURE: 60 MMHG

## 2019-08-30 VITALS — DIASTOLIC BLOOD PRESSURE: 61 MMHG | SYSTOLIC BLOOD PRESSURE: 116 MMHG

## 2019-08-30 VITALS — DIASTOLIC BLOOD PRESSURE: 43 MMHG

## 2019-08-30 LAB
ALBUMIN SERPL-MCNC: 3.2 GM/DL (ref 3.1–4.5)
ALP SERPL-CCNC: 106 U/L (ref 45–117)
ALT SERPL W P-5'-P-CCNC: 16 U/L (ref 12–78)
AST SERPL-CCNC: 17 IU/L (ref 3–35)
BASOPHILS # BLD AUTO: 0 10*3/UL (ref 0–0.1)
BASOPHILS NFR BLD AUTO: 0.3 % (ref 0–1)
BUN SERPL-MCNC: 27 MG/DL (ref 7–24)
CHLORIDE SERPL-SCNC: 98 MMOL/L (ref 98–107)
CREAT SERPL-MCNC: 1.29 MG/DL (ref 0.7–1.3)
EOSINOPHIL # BLD AUTO: 0.1 10*3/UL (ref 0–0.4)
EOSINOPHIL # BLD AUTO: 1 % (ref 1–4)
ERYTHROCYTE [DISTWIDTH] IN BLOOD BY AUTOMATED COUNT: 16.1 % (ref 0–14.5)
HCT VFR BLD AUTO: 30.2 % (ref 42–52)
HGB BLD-MCNC: 9.6 G/DL (ref 14–18)
LYMPHOCYTES # BLD AUTO: 1 10*3/UL (ref 1.3–4.4)
LYMPHOCYTES NFR BLD AUTO: 8.4 % (ref 27–41)
MCH RBC QN AUTO: 31 PG (ref 27–31)
MCHC RBC AUTO-ENTMCNC: 31.8 G/DL (ref 33–37)
MCV RBC AUTO: 97.4 FL (ref 80–94)
MONOCYTES # BLD AUTO: 1.2 10*3/UL (ref 0.1–1)
MONOCYTES NFR BLD MANUAL: 10.5 % (ref 3–9)
NEUT #: 9.3 10*3/UL (ref 2.3–7.9)
NEUT %: 79.3 % (ref 47–73)
NRBC BLD QL AUTO: 0 10*3/UL (ref 0–0)
PLATELET # BLD AUTO: 125 10*3/UL (ref 130–400)
PMV BLD AUTO: 11.8 FL (ref 9.6–12.3)
POTASSIUM SERPL-SCNC: 3.9 MMOL/L (ref 3.5–5.1)
PROT SERPL-MCNC: 6.5 GM/DL (ref 6.4–8.2)
RBC # BLD AUTO: 3.1 10*6/UL (ref 4.5–5.9)
SODIUM SERPL-SCNC: 138 MMOL/L (ref 136–145)
WBC NRBC COR # BLD AUTO: 11.7 10*3/UL (ref 4.8–10.8)

## 2019-08-30 NOTE — NUR
NOTIFIED THAT PATIENT IS STILL NOT FEELING WELL, OK TO HOLD OFF ON
[facility]IF[facility], PER SARTHAK THAT HAVE EXTRA
WOUND VAC SUPPLIED AND CANISTERS

## 2019-08-30 NOTE — NUR
IN RECLINER, RESPIRATIONS EASY. LUNGS DIMINISHED. PULSE OX 98% 2.5L. ARMS
ECCHYMOITC, WITH MULTIPLE WOUNDS. WOUND VAC INTACT. TUMS GIVEN FOR C/O
INDIGESTION. CALL LIGHT WITHIN REACH. NO VOICED COMPLAINTS.

## 2019-08-30 NOTE — NUR
PHYSICAL THERAPY CO-SIGN
 
 
I approve of the Physical Therapy notes written above.
 
                                KARYNA MARIE PT,DPT

## 2019-08-30 NOTE — NUR
AWAKE & ALERT RESTING IN CHAIR.  PT. IS VERY [place].  02 BEING MAINTAINED AT 2LPM
VIA  NASAL CANNULA.  PT. VOICES NO C/O AT THIS TIME.  NO DISTRESS NOTED.  CALL
LIGHT WITHIN REACH.

## 2019-08-30 NOTE — NUR
OT NOTE
 
 
PATIENT REPORTS NOT FEELING WELL THIS AM WITH NURSING NOTIFIED. WILL TRY BACK
LATER TIME/DATE.
 
 
NAOMY MOON/L

## 2019-08-30 NOTE — NUR
in to see patient. No new needs or request at this time. When
medically stable he will be discharged to [facility].
Discharge planner/ following.

## 2019-08-30 NOTE — NUR
Spoke to Shawna from [facility]. [facility] has canisters for patient's wound
vac when he is discharged. Nurse notified.

## 2019-08-30 NOTE — NUR
PHYSICAL THERAPY
 
Patient was approached several times this am for therapy visit and reported
increaed Nausea, with feeling tired / weak.  Patient was unable to attempt sit
to stand transfer safely due to NWB status on R LE and not appropriate for
treatment at this time. Will continue per POC as able.  Mohan Shaikh, PTA

## 2019-08-31 VITALS — DIASTOLIC BLOOD PRESSURE: 60 MMHG | SYSTOLIC BLOOD PRESSURE: 118 MMHG

## 2019-08-31 VITALS — DIASTOLIC BLOOD PRESSURE: 80 MMHG

## 2019-08-31 LAB
BASOPHILS # BLD AUTO: 0 10*3/UL (ref 0–0.1)
BASOPHILS NFR BLD AUTO: 0.3 % (ref 0–1)
BUN SERPL-MCNC: 31 MG/DL (ref 7–24)
CHLORIDE SERPL-SCNC: 93 MMOL/L (ref 98–107)
CREAT SERPL-MCNC: 1.53 MG/DL (ref 0.7–1.3)
EOSINOPHIL # BLD AUTO: 0.1 10*3/UL (ref 0–0.4)
EOSINOPHIL # BLD AUTO: 1.3 % (ref 1–4)
ERYTHROCYTE [DISTWIDTH] IN BLOOD BY AUTOMATED COUNT: 15.8 % (ref 0–14.5)
HCT VFR BLD AUTO: 31.9 % (ref 42–52)
HGB BLD-MCNC: 10 G/DL (ref 14–18)
LYMPHOCYTES # BLD AUTO: 1.1 10*3/UL (ref 1.3–4.4)
LYMPHOCYTES NFR BLD AUTO: 11.2 % (ref 27–41)
MCH RBC QN AUTO: 30.1 PG (ref 27–31)
MCHC RBC AUTO-ENTMCNC: 31.3 G/DL (ref 33–37)
MCV RBC AUTO: 96.1 FL (ref 80–94)
MONOCYTES # BLD AUTO: 1 10*3/UL (ref 0.1–1)
MONOCYTES NFR BLD MANUAL: 9.7 % (ref 3–9)
NEUT #: 7.7 10*3/UL (ref 2.3–7.9)
NEUT %: 76.9 % (ref 47–73)
NRBC BLD QL AUTO: 0 % (ref 0–0)
PLATELET # BLD AUTO: 124 10*3/UL (ref 130–400)
PMV BLD AUTO: 11.9 FL (ref 9.6–12.3)
POTASSIUM SERPL-SCNC: 3.9 MMOL/L (ref 3.5–5.1)
RBC # BLD AUTO: 3.32 10*6/UL (ref 4.5–5.9)
SODIUM SERPL-SCNC: 137 MMOL/L (ref 136–145)
WBC NRBC COR # BLD AUTO: 10 10*3/UL (ref 4.8–10.8)

## 2019-08-31 NOTE — NUR
PT. STATES THAT HIS STOMACH IS HURTING HIM & THAT HE HAS NOT HAD A BOWEL
MOVEMENT FOR 4 - 5 DAYS.  MEDICATED WITH MOM.

## 2019-08-31 NOTE — NUR
CCDIS
Discharge instructions reviewed with patient/family. Patient receptive and
verbalizes understanding. Follow-up care arranged. Written instructions given
to patient/family.
MARKIE LANG

## 2019-09-05 ENCOUNTER — HOSPITAL ENCOUNTER (EMERGENCY)
Dept: HOSPITAL 83 - ED | Age: 69
Discharge: ANOTHER HOSPITAL | End: 2019-09-05
Payer: MEDICARE

## 2019-09-05 VITALS — HEIGHT: 67.99 IN | BODY MASS INDEX: 33.34 KG/M2 | WEIGHT: 220 LBS

## 2019-09-05 DIAGNOSIS — I13.0: ICD-10-CM

## 2019-09-05 DIAGNOSIS — I50.9: ICD-10-CM

## 2019-09-05 DIAGNOSIS — Z88.6: ICD-10-CM

## 2019-09-05 DIAGNOSIS — F17.210: ICD-10-CM

## 2019-09-05 DIAGNOSIS — R65.20: ICD-10-CM

## 2019-09-05 DIAGNOSIS — Z79.4: ICD-10-CM

## 2019-09-05 DIAGNOSIS — E11.22: ICD-10-CM

## 2019-09-05 DIAGNOSIS — I46.9: ICD-10-CM

## 2019-09-05 DIAGNOSIS — A41.9: Primary | ICD-10-CM

## 2019-09-05 DIAGNOSIS — Z79.82: ICD-10-CM

## 2019-09-05 DIAGNOSIS — N18.3: ICD-10-CM

## 2019-09-05 DIAGNOSIS — I25.10: ICD-10-CM

## 2019-09-05 DIAGNOSIS — E66.9: ICD-10-CM

## 2019-09-05 DIAGNOSIS — Z79.899: ICD-10-CM

## 2019-09-05 DIAGNOSIS — G62.9: ICD-10-CM

## 2019-09-05 LAB
ALBUMIN SERPL-MCNC: 3 GM/DL (ref 3.1–4.5)
ALP SERPL-CCNC: 92 U/L (ref 45–117)
ALT SERPL W P-5'-P-CCNC: 17 U/L (ref 12–78)
APPEARANCE UR: (no result)
AST SERPL-CCNC: 20 IU/L (ref 3–35)
BACTERIA #/AREA URNS HPF: (no result) /[HPF]
BASOPHILS # BLD AUTO: 0 10*3/UL (ref 0–0.1)
BASOPHILS NFR BLD AUTO: 0.1 % (ref 0–1)
BILIRUB UR QL STRIP: NEGATIVE
BUN SERPL-MCNC: 91 MG/DL (ref 7–24)
CASTS URNS QL MICRO: (no result)
CHLORIDE SERPL-SCNC: 96 MMOL/L (ref 98–107)
COLOR UR: YELLOW
CREAT SERPL-MCNC: 3.5 MG/DL (ref 0.7–1.3)
CRYSTALS URNS MICRO: (no result)
EOSINOPHIL # BLD AUTO: 0.3 10*3/UL (ref 0–0.4)
EOSINOPHIL # BLD AUTO: 1.6 % (ref 1–4)
EPI CELLS #/AREA URNS HPF: (no result) /[HPF]
ERYTHROCYTE [DISTWIDTH] IN BLOOD BY AUTOMATED COUNT: 15.8 % (ref 0–14.5)
GLUCOSE UR QL: NEGATIVE
HCT VFR BLD AUTO: 25.8 % (ref 42–52)
HGB BLD-MCNC: 8.3 G/DL (ref 14–18)
HGB UR QL STRIP: NEGATIVE
KETONES UR QL STRIP: (no result)
LEUKOCYTE ESTERASE UR QL STRIP: (no result)
LYMPHOCYTES # BLD AUTO: 1 10*3/UL (ref 1.3–4.4)
LYMPHOCYTES NFR BLD AUTO: 6.3 % (ref 27–41)
MCH RBC QN AUTO: 30.6 PG (ref 27–31)
MCHC RBC AUTO-ENTMCNC: 32.2 G/DL (ref 33–37)
MCV RBC AUTO: 95.2 FL (ref 80–94)
MONOCYTES # BLD AUTO: 1.3 10*3/UL (ref 0.1–1)
MONOCYTES NFR BLD MANUAL: 8.8 % (ref 3–9)
NEUT #: 12.5 10*3/UL (ref 2.3–7.9)
NEUT %: 82.5 % (ref 47–73)
NITRITE UR QL STRIP: NEGATIVE
NRBC BLD QL AUTO: 0 10*3/UL (ref 0–0)
PH UR STRIP: 5.5 [PH] (ref 5–9)
PLATELET # BLD AUTO: 151 10*3/UL (ref 130–400)
PMV BLD AUTO: 11.2 FL (ref 9.6–12.3)
POTASSIUM SERPL-SCNC: 5.2 MMOL/L (ref 3.5–5.1)
PROT SERPL-MCNC: 6.3 GM/DL (ref 6.4–8.2)
RBC # BLD AUTO: 2.71 10*6/UL (ref 4.5–5.9)
RBC #/AREA URNS HPF: (no result) RBC/HPF (ref 0–2)
SODIUM SERPL-SCNC: 134 MMOL/L (ref 136–145)
SP GR UR: 1.01 (ref 1–1.03)
UROBILINOGEN UR STRIP-MCNC: 0.2 E.U./DL (ref 0.2–1)
WBC #/AREA URNS HPF: (no result) WBC/HPF (ref 0–5)
WBC NRBC COR # BLD AUTO: 15.2 10*3/UL (ref 4.8–10.8)

## 2019-09-05 NOTE — EKG
[place], [place]
 
                               ELECTROCARDIOGRAM REPORT
 
        NAME: ELISABETH BENAVIDES SR              ACCT #: [id number]  
        UNIT #: [id number]                        ROOM:           
        DOCTOR: EPIPHANY DRAFT REPORT          BIRTHDATE: [birth year]
 
 
 

 
 
                           [facility]
                                       
Test Date:    2019               Test Time:    13:17:03
Pat Name:     ELISABETH BENAVIDES           Department:   
Patient ID:   [id number]             Room:          
Gender:                               Technician:   
:          1950               Requested By: YAMILEX ARREOLA
Order Number: [id number]      Reading MD:   Manuela Linn MD
                                 Measurements
Intervals                              Axis          
Rate:         56                       P:            
[place]:                                    QRS:          -117
QRSD:         200                      T:            103
QT:           597                                    
QTc:          577                                    
                           Interpretive Statements
Junctional rhythm
Right bundle branch block
Repol abnrm suggests ischemia, diffuse leads
Compared to ECG 2019 19:31:51
Junctional rhythm now present
Early repolarization now present
Possible ischemia now present
Sinus rhythm no longer present
Left posterior fascicular block no longer present
 
Electronically Signed On 2019 7:43:18 PDT by Manuela Linn MD
 
CM:EKGRPT:ELECTROCARDIOGRAM REPORT
 
D: 19 1317
T: 19 0743
    
YAMILEX JEFFERSON DRAFT REPORT         
YAMILEX ARREOLA M.D.

## 2019-09-10 NOTE — NUR
ROXANNE received phone call from [facility]  Tyra ([phone]).
She stated we transferred the patient to their facility and she needed a
number for the [facility]. She verified the patients . Provided her with
[facility] number and also number listed for the patients Nistuart Barrera.
-ROXANNE Atkinson

## 2023-04-27 NOTE — NUR
PHYSICAL THERAPY
 
Patient seen this pm 1;1 for therapy visit and was sitting up in bedside chair
upon therapist arrival. Patient presented with continous O2-2L via NC with
SpO2 100% and remainder of vital signs WFL's. Patient reports mild R foot/toe
discomfort which was wrapped in Gauze as patient is NWB on R LE per physician
order. Patient performed several sit to stand transfers at chair side, MOD A x
2, demonstrating slow rise and only 50 % compliance with NWB status. Patient
needed v/c to improve both hand placement and technique during transfer and
tolerated approx 1 minute first attempt and 30-40 seconds each additional
trial. Patient fatigues quickly and remained in bedside chair with B LE's
elevated in reclined position, call light, tray table and telephone. Will
continue per POC as tolerated, total treatment time 14 minutes. Mohan Shaikh,
PTA show

## 2025-07-16 NOTE — EKG
[place], [place]
 
                               ELECTROCARDIOGRAM REPORT
 
        NAME: ELISABETH BENAVIDES SR              ACCT #: [id number]  
        UNIT #: [id number]                        ROOM:           
        DOCTOR: EPIPHANY DRAFT REPORT          BIRTHDATE: [birth year]
 
 
 

 
 
                           [facility]
                                       
Test Date:    2019               Test Time:    09:03:26
Pat Name:     ELISABETH BENAVIDES           Department:   
Patient ID:   [id number]             Room:          
Gender:                               Technician:   
:          1950               Requested By: YAMILEX ARREOLA
Order Number: [id number]      Reading MD:   Manuela Linn MD
                                 Measurements
Intervals                              Axis          
Rate:         59                       P:            69
IN:                                    QRS:          103
QRSD:         169                      T:            84
QT:           541                                    
QTc:          536                                    
                           Interpretive Statements
Sinus rhythm
Paired ventricular premature complexes
RBBB and LPFB
 
Electronically Signed On 2019 7:42:52 PDT by Manuela Linn MD
 
CM:EKGRPT:ELECTROCARDIOGRAM REPORT
 
D: 1903
T: 19 0742
    
YAMILEX JEFFERSON DRAFT REPORT         
YAMILEX ARREOLA M.D. Initiate Treatment: clobetasol 0.05 % topical cream Twice daily\\nQuantity: 45.0 g  Days Supply: 28\\nSig: Apply thin film topically twice daily as needed. Detail Level: Zone